# Patient Record
Sex: MALE | Race: WHITE | URBAN - METROPOLITAN AREA
[De-identification: names, ages, dates, MRNs, and addresses within clinical notes are randomized per-mention and may not be internally consistent; named-entity substitution may affect disease eponyms.]

---

## 2017-01-01 ENCOUNTER — TELEPHONE (OUTPATIENT)
Dept: TRANSPLANT | Facility: CLINIC | Age: 67
End: 2017-01-01

## 2017-01-01 ENCOUNTER — TRANSFERRED RECORDS (OUTPATIENT)
Dept: HEALTH INFORMATION MANAGEMENT | Facility: CLINIC | Age: 67
End: 2017-01-01

## 2017-01-31 NOTE — TELEPHONE ENCOUNTER
SW received a call from pt (P: [phone]). Pt shared that he is confused and a little frustrated as he is not understanding what the plan is for his cancer tx. Pt shared that his Oncologist is not sure if he will need more chemo therapy or if he will come for BMT. Pt shared that he saw Dr. Cool on 12/12/2016 for an New Transplant and he does not want to have to go through the New Transplant visit again; pt prefers to come for work-up. Pt wants to understand that his Oncologist and BMT are on the same page. SW explained that Isidoro Ellis (P: [phone]) would be able to answer any questions related to BMT work-up; SW provided Isidoro's phone number and pt said he will call Isidoro today. SW also explained that once pt comes for work-up, SW can assist with lodging in the area.    MYRTLE Cabrera, [facility]  Phone: [phone]  Pager: [phone]